# Patient Record
(demographics unavailable — no encounter records)

---

## 2025-05-23 NOTE — DISCUSSION/SUMMARY
[de-identified] : Impression: Possible rotator cuff sprain, possible bursitis  Plan: Patient was over a cortisone injection, patient agrees. After verbal consent, a cortisone injection to the   shoulder was given. In a sterile technique using alcohol as antiseptic and ethyl chloride as an anesthetic a cortisone injection was given to the right shoulder using a sterile technique, and I used the posterior approach, cortisone injection was given to subacromial space and glenohumeral joint. Patient tolerated well.  Band-Aid was placed over the puncture site. Patient was advised that these injections can only be given 3 times a year.  Follow-up: 4 to 6 weeks

## 2025-05-23 NOTE — IMAGING
[de-identified] : Examination of the right shoulder Good range of motion to the Apley scratch. Good motor strength to the rotator cuff. Mildly positive impingement. Positive Calzada.  Negative drop arm test.

## 2025-05-23 NOTE — HISTORY OF PRESENT ILLNESS
[de-identified] : 55-year-old male here for a repeat evaluation of injury sustained to the right shoulder, patient states that the pain feels about the same. Patient states that is a on and off pain to the shoulder, clicking of the shoulder.

## 2025-07-17 NOTE — HISTORY OF PRESENT ILLNESS
[de-identified] : 55-year-old gentleman presents his office for ongoing management of right shoulder injury.  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  Subsequent was seen in our walk-in clinic and then follow-up with Sydnee Mendoza.  Provided a subacromial cortisone injection which she does not think meaningfully helped his pain.  He has continued to weight lift including bench pressing.  Does note his shoulder pain is gotten better and does not really have pain when he weight lifts.  Has not had any further popping sensation.  Does note generalized decreased sensation in his upper extremity at night.  Does not sleep on a particular side.  Has lost 55 pounds on a GLP-1 (Wegovy) medication started in December.  Past medical history: Hypertension Prediabetes  Medications: Losartan Wegovy  Allergies: Penicillin (facial swelling after extraction of a tooth)  Social: Former smoker discontinued 20 years ago rare social EtOH, any marijuana use   , lives with wife retired  currently working security

## 2025-07-17 NOTE — ASSESSMENT
[FreeTextEntry1] : 55-year-old gentleman with improving right shoulder pain after a popping sensation.  Does note that 10 or 15 years ago he had partial biceps tendon rupture which she treated nonoperatively.  Wonders if he ruptured his biceps on the right side.  There is no stigmata of a biceps tendon rupture.  There is no bawling up of his biceps muscle and there is no tenderness in the area but it is certainly possible.  Regardless I would treat him symptomatically.  I would restrict activities below shoulder height for strength training.  We talked about exercises for rotator cuff strengthening.  He will continue with a self-directed exercise program.  The patient also has complaints of paresthesias in his upper extremity at night.  Etiology is unclear.  His examination unremarkable.  I did have him checked out by neurology.  Will defer to them about whether he would benefit from EMG testing and/or MRI of the cervical spine.  Patient agrees with plan.  All questions answered to his satisfaction.

## 2025-07-17 NOTE — IMAGING
[de-identified] : Pleasant middle-age gentleman sits comfortably in the office no distress.  Physical examination: Right shoulder: Relatively normal exam.  Active forward flexion 170 degrees.  Abduction 160 degrees.  With the shoulder held at 90 degrees of abduction external rotation 80 degrees internal rotation 60 degrees.  With his arm at side internal rotation to his belt line.  He has difficulty with liftoff test.  He reports that he has always had problems with this.  Negative speeds test.  Negative Yergason's Test.  Negative cross chest test.  Mildly abnormal impingement sign and Calzada test.  No axillary lymph nodes.  Normal light sensation actually nerve distribution.  No undue swelling about her shoulder.  Well-developed musculature.  Does not have tenderness along the long head of the biceps.  Radiographs: Right shoulder (AP, internal Tatian lateral, Y scapular): Normal bony anatomy.  No evidence of fracture or high riding humeral head.